# Patient Record
Sex: FEMALE | Race: BLACK OR AFRICAN AMERICAN
[De-identification: names, ages, dates, MRNs, and addresses within clinical notes are randomized per-mention and may not be internally consistent; named-entity substitution may affect disease eponyms.]

---

## 2018-02-01 ENCOUNTER — HOSPITAL ENCOUNTER (INPATIENT)
Dept: HOSPITAL 68 - SDA | Age: 53
LOS: 1 days | DRG: 983 | End: 2018-02-02
Attending: SPECIALIST | Admitting: SPECIALIST
Payer: COMMERCIAL

## 2018-02-01 VITALS — SYSTOLIC BLOOD PRESSURE: 100 MMHG | DIASTOLIC BLOOD PRESSURE: 62 MMHG

## 2018-02-01 VITALS — BODY MASS INDEX: 23.32 KG/M2 | HEIGHT: 65 IN | WEIGHT: 140 LBS

## 2018-02-01 DIAGNOSIS — R10.2: Primary | ICD-10-CM

## 2018-02-01 DIAGNOSIS — E03.9: ICD-10-CM

## 2018-02-01 LAB
ABSOLUTE GRANULOCYTE CT: 10.5 /CUMM (ref 1.4–6.5)
BASOPHILS # BLD: 0 /CUMM (ref 0–0.2)
BASOPHILS NFR BLD: 0 % (ref 0–2)
EOSINOPHIL # BLD: 0 /CUMM (ref 0–0.7)
EOSINOPHIL NFR BLD: 0 % (ref 0–5)
ERYTHROCYTE [DISTWIDTH] IN BLOOD BY AUTOMATED COUNT: 14.5 % (ref 11.5–14.5)
GRANULOCYTES NFR BLD: 94.2 % (ref 42.2–75.2)
HCT VFR BLD CALC: 33.3 % (ref 37–47)
LYMPHOCYTES # BLD: 0.4 /CUMM (ref 1.2–3.4)
MCH RBC QN AUTO: 30.6 PG (ref 27–31)
MCHC RBC AUTO-ENTMCNC: 32.9 G/DL (ref 33–37)
MCV RBC AUTO: 93 FL (ref 81–99)
MONOCYTES # BLD: 0.3 /CUMM (ref 0.1–0.6)
PLATELET # BLD: 255 /CUMM (ref 130–400)
PMV BLD AUTO: 7.9 FL (ref 7.4–10.4)
RED BLOOD CELL CT: 3.58 /CUMM (ref 4.2–5.4)
WBC # BLD AUTO: 11.2 /CUMM (ref 4.8–10.8)

## 2018-02-01 PROCEDURE — 0UT90ZZ RESECTION OF UTERUS, OPEN APPROACH: ICD-10-PCS | Performed by: SPECIALIST

## 2018-02-01 PROCEDURE — 0UT20ZZ RESECTION OF BILATERAL OVARIES, OPEN APPROACH: ICD-10-PCS | Performed by: SPECIALIST

## 2018-02-01 PROCEDURE — 0T788DZ DILATION OF BILATERAL URETERS WITH INTRALUMINAL DEVICE, VIA NATURAL OR ARTIFICIAL OPENING ENDOSCOPIC: ICD-10-PCS | Performed by: UROLOGY

## 2018-02-01 PROCEDURE — 0UT70ZZ RESECTION OF BILATERAL FALLOPIAN TUBES, OPEN APPROACH: ICD-10-PCS | Performed by: SPECIALIST

## 2018-02-01 NOTE — OPERATIVE REPORT
Operative/Inv Procedure Report
Surgery Date: 02/01/18
Name of Procedure:
cystosocopy. bilateral stent insertion
Pre-Operative Diagnosis:
Fibroids
Post-Operative Diagnosis:
Same
Estimated Blood Loss: scant
Surgeon/Assistant:
MD Carmichael Arnold-Urology
 
Anesthesia: general endotracheal tube
Specimens:
Urine culture
Complications:
None
 
Operative/Procedure Note
Note:
The patient was taken to the operating room and placed on the OR table in supine
position.  Timeout was performed, with the patient awake, to confirm correct 
identify,  planned procedures, anesthesia, antibiotics, and other pertinent lilibeth
-operative information.  After adequate anesthesia, and IV antibiotics, the 
patient was placed in lithotomy Yellow-fin stirrups.  She was then draped and 
prepped in the usual surgical fashion, including a vaginal prep.  A 22 Dominican 
cystoscope sheath with a 30 angle lens was inserted into the bladder without 
significant difficulty.  The bladder was thoroughly and systematically examined,
and was noted to be free of tumor, free of stone, free of endometriosis.  Both 
ureteral orifices were in their orthotopic positions with clear reflux 
bilaterally.  Under direct visualization the left orifice was intubated with a 5
Dominican whistle-tip catheter, which was advanced easily into the left kidney 
pelvis.  The right ureteral orifice was intubated with a second 5 Dominican 
ureteral whistle tip catheter, and advanced into the right renal pelvis without 
difficulty.  For identification purposes the blue marked stent went into the 
left kidney, and the right ureteral stent was marked red.  Urine culture was 
obtained and sent to pathology.  The cystoscope was then removed leaving both 
stents in proper place.  An 18 Dominican Sharp catheter was inserted draining clear
fluid and 10 mL of sterile water was then placed in the balloon.  The ends of 
the ureteral stents, which protruded externally, were taped to the Sharp 
catheter in order to secure their position.  The individual ureteral stents were
then connected to their individual drainage devices.  The patient tolerated the 
procedure well.  All sponge needle and instrument count were correct at the end 
of this procedure.  The patient was then repositioned in supine position, and 
Venodynes were placed on the lower extremeties bilaterally.  At this point, Dr. Yen was able to proceed with the patient's surgery.
 
Discharge Disposition: proceed with Dr. Yen
CC:
Calos Carmichael MD

## 2018-02-01 NOTE — OPERATIVE REPORT
Operative/Inv Procedure Report
Surgery Date: 02/01/18
Name of Procedure:
Total abdominal hysterectomy bilateral salpingo-oophorectomy removal of stents 
the a Pfannenstiel skin incision
Pre-Operative Diagnosis:
Pelvic pain
Post-Operative Diagnosis:
Same
Estimated Blood Loss: 500
Surgeon/Assistant:
Farshad MAZARIEGOS,Aspen BERMUDEZ and Dr. Agustin clifton [facility]
 
Anesthesia: general endotracheal tube, block
 
Operative/Procedure Note
Note:
She did well patient was taken the operating room placed supine position after 
adequate induction general anesthesia via endotracheal tube patient placed in 
dorsolithotomy position the patient was on under adequate anesthesia and Dr. Calos Carmichael on placed stents.  He will dictate the placement of stents and 
cystoscopy.  She was returned spine position the abdomen was prepped and draped 
so fashion through an old Pfannenstiel skin incision skin was cut carried down 
to rectus fascia with second knife on Bovie coagulation was used for hemostasis 
the peritoneal cavity was entered on high into the abdomen Rogers O'Levon was
placed in usual fashion I the on  shunt was noted in the pelvis on the round 
ligament on the uterus was identified suture-ligated 0 Was taken with the 
Rogers O'Levon to elevate the retractor to avoid damage to the obturators.  I
at this point the bladder flap was developed sharply the on sequentially 
cervical branches uterine artery and the right and left clamped and cut to level
of the external os and suture ligated using 0 specimen was removed on using a 
Bovie on the cuff was oversewn running locking suture of 0 was indicated 
imbricated with interrupted figure-of-eight's for hemostasis on the right ovary 
was picked up with the tube clamped times to on and suture ligated using 0 times
to the left ovary and tube were elevated and clamped times to removed on and the
endopelvic artery was suture ligated 2 using 0 hemostasis was apparent the 
abdomen was irrigated copious amounts warm saline the stent was elevated to be 
placed into the abdomen a [place] was applied to the cuff patient tolerated that 
well all instruments were removed from the abdomen the eye peritoneum was 
reapproximated 0 the fascia was reapproximated to continue sutures #1 the skin 
was reapproximated using staples after Bovie coagulation substance tissue the 
vagina was irrigated found to be hemostatic and the stents removed bilaterally 
tip intact the patient was awakened from anesthesia all extubated and 
transferred recovery room awake alert

## 2018-02-02 LAB
ABSOLUTE GRANULOCYTE CT: 14.5 /CUMM (ref 1.4–6.5)
BASOPHILS # BLD: 0 /CUMM (ref 0–0.2)
BASOPHILS NFR BLD: 0.1 % (ref 0–2)
EOSINOPHIL # BLD: 0 /CUMM (ref 0–0.7)
EOSINOPHIL NFR BLD: 0 % (ref 0–5)
ERYTHROCYTE [DISTWIDTH] IN BLOOD BY AUTOMATED COUNT: 14.4 % (ref 11.5–14.5)
GRANULOCYTES NFR BLD: 90.3 % (ref 42.2–75.2)
HCT VFR BLD CALC: 33 % (ref 37–47)
LYMPHOCYTES # BLD: 0.6 /CUMM (ref 1.2–3.4)
MCH RBC QN AUTO: 30.6 PG (ref 27–31)
MCHC RBC AUTO-ENTMCNC: 33 G/DL (ref 33–37)
MCV RBC AUTO: 92.9 FL (ref 81–99)
MONOCYTES # BLD: 0.9 /CUMM (ref 0.1–0.6)
PLATELET # BLD: 264 /CUMM (ref 130–400)
PMV BLD AUTO: 8.8 FL (ref 7.4–10.4)
RED BLOOD CELL CT: 3.55 /CUMM (ref 4.2–5.4)
WBC # BLD AUTO: 16.1 /CUMM (ref 4.8–10.8)

## 2018-02-02 NOTE — PN- POST DELIVERY/GYN
Subjective
Subjective:
WANTS TO GO HOME 
 
 
Objective
Last 24 Hrs of Vital Signs/I&O
 Vital Signs
 
 
Date Time Temp Pulse Resp B/P B/P Pulse O2 O2 Flow FiO2
 
     Mean Ox Delivery Rate 
 
02/01 2210  60  100/62     
 
 
 
Physical Exam:
PE THIN BF IN NAD
ABD SOFT NT 
INCISION CDI 
EXT -EDEMA-HOMANS 
 
Assessment/Plan
Assessment/Plan
ASSESS S/P PAULINA BSO 
PLAN ADVANCE DIET
ADVANCE AMBULTION